# Patient Record
Sex: MALE | NOT HISPANIC OR LATINO | URBAN - METROPOLITAN AREA
[De-identification: names, ages, dates, MRNs, and addresses within clinical notes are randomized per-mention and may not be internally consistent; named-entity substitution may affect disease eponyms.]

---

## 2020-04-06 ENCOUNTER — IMPORTED ENCOUNTER (OUTPATIENT)
Dept: URBAN - METROPOLITAN AREA CLINIC 1 | Facility: CLINIC | Age: 45
End: 2020-04-06

## 2020-04-06 PROBLEM — H52.4: Noted: 2020-04-06

## 2020-04-06 PROCEDURE — S0620 ROUTINE OPHTHALMOLOGICAL EXA: HCPCS

## 2020-04-06 NOTE — PATIENT DISCUSSION
1.  Presbyopia -- Rx was given for corrective spectacles if indicated. Return for an appointment in 1 year 36 with Dr. Radha Malik.

## 2021-04-07 ENCOUNTER — IMPORTED ENCOUNTER (OUTPATIENT)
Dept: URBAN - METROPOLITAN AREA CLINIC 1 | Facility: CLINIC | Age: 46
End: 2021-04-07

## 2021-04-07 PROBLEM — H52.4: Noted: 2021-04-07

## 2021-04-07 PROBLEM — H52.221: Noted: 2021-04-07

## 2021-04-07 PROCEDURE — S0621 ROUTINE OPHTHALMOLOGICAL EXA: HCPCS

## 2021-04-07 NOTE — PATIENT DISCUSSION
1.  [place] w/ Astigmatism OD -- Rx was given for correction if indicated and requested. 2. Presbyopia Return for an appointment in 1 year 36 with Dr. Francoise Alaniz.

## 2022-04-02 ASSESSMENT — VISUAL ACUITY
OS_CC: 20/20
OD_SC: J3+2
OD_SC: J1
OS_CC: 20/20
OS_SC: J3+2
OD_CC: 20/20-1
OD_CC: 20/20
OS_SC: J1

## 2022-04-02 ASSESSMENT — KERATOMETRY
OS_AXISANGLE2_DEGREES: 033
OS_K1POWER_DIOPTERS: 44.25
OD_AXISANGLE_DEGREES: 067
OD_K2POWER_DIOPTERS: 44.50
OD_K1POWER_DIOPTERS: 43.75
OD_AXISANGLE2_DEGREES: 157
OS_K2POWER_DIOPTERS: 44.75
OS_AXISANGLE_DEGREES: 123

## 2022-04-02 ASSESSMENT — TONOMETRY
OD_IOP_MMHG: 16
OS_IOP_MMHG: 16
OS_IOP_MMHG: 17
OD_IOP_MMHG: 14

## 2022-12-15 ENCOUNTER — COMPREHENSIVE EXAM (OUTPATIENT)
Dept: URBAN - METROPOLITAN AREA CLINIC 1 | Facility: CLINIC | Age: 47
End: 2022-12-15

## 2022-12-15 PROCEDURE — 92015 DETERMINE REFRACTIVE STATE: CPT

## 2022-12-15 PROCEDURE — 92014 COMPRE OPH EXAM EST PT 1/>: CPT

## 2022-12-15 ASSESSMENT — KERATOMETRY
OS_AXISANGLE2_DEGREES: 033
OS_K2POWER_DIOPTERS: 44.75
OD_K1POWER_DIOPTERS: 43.75
OD_AXISANGLE_DEGREES: 067
OD_K2POWER_DIOPTERS: 44.50
OS_K1POWER_DIOPTERS: 44.25
OS_AXISANGLE_DEGREES: 123
OD_AXISANGLE2_DEGREES: 157

## 2022-12-15 ASSESSMENT — TONOMETRY
OD_IOP_MMHG: 16
OS_IOP_MMHG: 16

## 2022-12-15 ASSESSMENT — VISUAL ACUITY
OU_CC: J1
OS_SC: 20/20
OS_CC: J1
OD_SC: 20/20
OD_CC: J2

## 2023-08-27 NOTE — PATIENT DISCUSSION
Glasses Prescription given to patient.
Patient advised to call if any problems, questions, or concerns.
Patient made aware of 24/7 emergency services.
Patient understands condition, prognosis and need for follow up care.
Stable, observe.
kept in storage on unit